# Patient Record
Sex: FEMALE | Race: WHITE | Employment: FULL TIME | URBAN - METROPOLITAN AREA
[De-identification: names, ages, dates, MRNs, and addresses within clinical notes are randomized per-mention and may not be internally consistent; named-entity substitution may affect disease eponyms.]

---

## 2017-02-15 ENCOUNTER — OFFICE VISIT (OUTPATIENT)
Dept: FAMILY MEDICINE | Facility: CLINIC | Age: 27
End: 2017-02-15
Payer: COMMERCIAL

## 2017-02-15 VITALS
HEART RATE: 62 BPM | WEIGHT: 162 LBS | DIASTOLIC BLOOD PRESSURE: 78 MMHG | TEMPERATURE: 96.3 F | SYSTOLIC BLOOD PRESSURE: 121 MMHG | HEIGHT: 68 IN | BODY MASS INDEX: 24.55 KG/M2

## 2017-02-15 DIAGNOSIS — Z00.00 ROUTINE GENERAL MEDICAL EXAMINATION AT A HEALTH CARE FACILITY: Primary | ICD-10-CM

## 2017-02-15 DIAGNOSIS — L60.3 BRITTLE NAILS: ICD-10-CM

## 2017-02-15 DIAGNOSIS — Z23 NEED FOR HEPATITIS A VACCINATION: ICD-10-CM

## 2017-02-15 LAB
CHOLEST SERPL-MCNC: 199 MG/DL
GLUCOSE SERPL-MCNC: 94 MG/DL (ref 70–99)
HDLC SERPL-MCNC: 50 MG/DL
LDLC SERPL CALC-MCNC: 128 MG/DL
NONHDLC SERPL-MCNC: 149 MG/DL
TRIGL SERPL-MCNC: 106 MG/DL
TSH SERPL DL<=0.005 MIU/L-ACNC: 0.98 MU/L (ref 0.4–4)

## 2017-02-15 PROCEDURE — 90632 HEPA VACCINE ADULT IM: CPT | Performed by: PHYSICIAN ASSISTANT

## 2017-02-15 PROCEDURE — 90471 IMMUNIZATION ADMIN: CPT | Performed by: PHYSICIAN ASSISTANT

## 2017-02-15 PROCEDURE — 80061 LIPID PANEL: CPT | Performed by: PHYSICIAN ASSISTANT

## 2017-02-15 PROCEDURE — 36415 COLL VENOUS BLD VENIPUNCTURE: CPT | Performed by: PHYSICIAN ASSISTANT

## 2017-02-15 PROCEDURE — 84443 ASSAY THYROID STIM HORMONE: CPT | Performed by: PHYSICIAN ASSISTANT

## 2017-02-15 PROCEDURE — 99395 PREV VISIT EST AGE 18-39: CPT | Mod: 25 | Performed by: PHYSICIAN ASSISTANT

## 2017-02-15 PROCEDURE — 82947 ASSAY GLUCOSE BLOOD QUANT: CPT | Performed by: PHYSICIAN ASSISTANT

## 2017-02-15 NOTE — MR AVS SNAPSHOT
After Visit Summary   2/15/2017    Kina Melendez    MRN: [id number]           Patient Information     Date Of Birth          1990        Visit Information        Provider Department      2/15/2017 8:20 AM Deonna Strong PA-C [facility]        Today's Diagnoses     Need for hepatitis A vaccination    -  1    Routine general medical examination at a health care facility        Brittle nails          Care Instructions      Preventive Health Recommendations  Female Ages 26 - 39  Yearly exam:   See your health care provider every year in order to    Review health changes.     Discuss preventive care.      Review your medicines if you your doctor has prescribed any.    Until age 30: Get a Pap test every three years (more often if you have had an abnormal result).    After age 30: Talk to your doctor about whether you should have a Pap test every 3 years or have a Pap test with HPV screening every 5 years.   You do not need a Pap test if your uterus was removed (hysterectomy) and you have not had cancer.  You should be tested each year for STDs (sexually transmitted diseases), if you're at risk.   Talk to your provider about how often to have your cholesterol checked.  If you are at risk for diabetes, you should have a diabetes test (fasting glucose).  Shots: Get a flu shot each year. Get a tetanus shot every 10 years.   Nutrition:     Eat at least 5 servings of fruits and vegetables each day.    Eat whole-grain bread, whole-wheat pasta and brown rice instead of white grains and rice.    Talk to your provider about Calcium and Vitamin D.     Lifestyle    Exercise at least 150 minutes a week (30 minutes a day, 5 days of the week). This will help you control your weight and prevent disease.    Limit alcohol to one drink per day.    No smoking.     Wear sunscreen to prevent skin cancer.    See your dentist every six months for an exam and cleaning.        Based on your medical  history and these are the current health maintenance or preventive care services that you are due for (some may have been done at this visit)  Health Maintenance Due   Topic Date Due     INFLUENZA VACCINE (SYSTEM ASSIGNED)  09/01/2016         At [facility], we strive to deliver an exceptional experience to you, every time we see you.    If you receive a survey in the mail, please send us back your thoughts. We really do value your feedback.    Your care team's suggested websites for health information:  Www.[employer].org : Up to date and easily searchable information on multiple topics.  Www.medlineplus.gov : medication info, interactive tutorials, watch real surgeries online  Www.familydoctor.org : good info from the Academy of Family Physicians  Www.cdc.gov : public health info, travel advisories, epidemics (H1N1)  Www.aap.org : children's health info, normal development, vaccinations  Www.health.[facility].[place].us : [place] dept of health, public health issues in [place], N1N1    How to contact your care team:   Team Deepthi/Vinny [phone]         Pharmacy [phone]    Dr. Bar, Raina Castro PA-C, Dr. Harris, Demetrice SENIOR CNP, Deonna Strong PA-C, Dr. Rosales, and PARRISH Soler CNP    Team RNs: Renetta & Katherine      Clinic hours  M-Th 7 am-7 pm   Fri 7 am-5 pm.   Urgent care M-F 11 am-9 pm,   Sat/Sun 9 am-5 pm.  Pharmacy M-Th 8 am-8 pm Fri 8 am-6 pm  Sat/Sun 9 am-5 pm.     All password changes, disabled accounts, or ID changes in [employer]/MyHealth will be done by our Access Services Department.    If you need help with your account or password, call: [phone]. Clinic staff no longer has the ability to change passwords.           Follow-ups after your visit        Who to contact     If you have questions or need follow up information about today's clinic visit or your schedule please contact [facility] directly at [phone].  Normal or non-critical  "lab and imaging results will be communicated to you by MyChart, letter or phone within 4 business days after the clinic has received the results. If you do not hear from us within 7 days, please contact the clinic through [employer] or phone. If you have a critical or abnormal lab result, we will notify you by phone as soon as possible.  Submit refill requests through [employer] or call your pharmacy and they will forward the refill request to us. Please allow 3 business days for your refill to be completed.          Additional Information About Your Visit        [employer] Information     [employer] gives you secure access to your electronic health record. If you see a primary care provider, you can also send messages to your care team and make appointments. If you have questions, please call your primary care clinic.  If you do not have a primary care provider, please call [phone] and they will assist you.        Care EveryWhere ID     This is your Care EveryWhere ID. This could be used by other organizations to access your [facility] medical records  [id number]        Your Vitals Were     Pulse Temperature Height Last Period Breastfeeding? BMI (Body Mass Index)    62 96.3  F (35.7  C) (Oral) 5' 8\" (1.727 m) 01/30/2017 No 24.63 kg/m2       Blood Pressure from Last 3 Encounters:   02/15/17 121/78   11/09/16 126/76   06/10/16 110/73    Weight from Last 3 Encounters:   02/15/17 162 lb (73.5 kg)   11/09/16 158 lb (71.7 kg)   06/10/16 154 lb (69.9 kg)              We Performed the Following     Glucose     HEPATITIS A VACCINE (ADULT)     Lipid panel reflex to direct LDL     TSH with free T4 reflex        Primary Care Provider Office Phone # Fax #    Deonna Strong PA-C [phone]       [facility] [address]  [facility] [address]        Thank you!     Thank you for choosing [facility]  for your care. Our goal is always to provide you with excellent care. Hearing back " from our patients is one way we can continue to improve our services. Please take a few minutes to complete the written survey that you may receive in the mail after your visit with us. Thank you!             Your Updated Medication List - Protect others around you: Learn how to safely use, store and throw away your medicines at www.disposemymeds.org.          This list is accurate as of: 2/15/17  9:16 AM.  Always use your most recent med list.                   Brand Name Dispense Instructions for use    triamcinolone 0.1 % cream    KENALOG    30 g    Apply sparingly to affected area two times daily for 10 days.

## 2017-02-15 NOTE — NURSING NOTE
Screening Questionnaire for Adult Immunization    Are you sick today?   No   Do you have allergies to medications, food, a vaccine component or latex?   No   Have you ever had a serious reaction after receiving a vaccination?   No   Do you have a long-term health problem with heart disease, lung disease, asthma, kidney disease, metabolic disease (e.g. diabetes), anemia, or other blood disorder?   No   Do you have cancer, leukemia, HIV/AIDS, or any other immune system problem?   No   In the past 3 months, have you taken medications that affect  your immune system, such as prednisone, other steroids, or anticancer drugs; drugs for the treatment of rheumatoid arthritis, Crohn s disease, or psoriasis; or have you had radiation treatments?   No   Have you had a seizure, or a brain or other nervous system problem?   No   During the past year, have you received a transfusion of blood or blood     products, or been given immune (gamma) globulin or antiviral drug?   No   For women: Are you pregnant or is there a chance you could become        pregnant during the next month?   No   Have you received any vaccinations in the past 4 weeks?   No     Immunization questionnaire answers were all negative.      MNVFC doesn't apply on this patient    Per orders of KIRAN Strong, injection of Hep A given by Lucy Buck. Patient instructed to remain in clinic for 20 minutes afterwards, and to report any adverse reaction to me immediately.       Screening performed by Lucy Buck on 2/15/2017 at 9:21 AM.

## 2017-02-15 NOTE — NURSING NOTE
"Chief Complaint   Patient presents with     Physical     Pt is fasting.       Initial /78 (BP Location: Right arm, Patient Position: Chair, Cuff Size: Adult Regular)  Pulse 62  Temp 96.3  F (35.7  C) (Oral)  Ht 5' 8\" (1.727 m)  Wt 162 lb (73.5 kg)  LMP 01/30/2017  Breastfeeding? No  BMI 24.63 kg/m2 Estimated body mass index is 24.63 kg/(m^2) as calculated from the following:    Height as of this encounter: 5' 8\" (1.727 m).    Weight as of this encounter: 162 lb (73.5 kg).  Medication Reconciliation: complete   An,CMA      "

## 2017-02-15 NOTE — PATIENT INSTRUCTIONS
Preventive Health Recommendations  Female Ages 26 - 39  Yearly exam:   See your health care provider every year in order to    Review health changes.     Discuss preventive care.      Review your medicines if you your doctor has prescribed any.    Until age 30: Get a Pap test every three years (more often if you have had an abnormal result).    After age 30: Talk to your doctor about whether you should have a Pap test every 3 years or have a Pap test with HPV screening every 5 years.   You do not need a Pap test if your uterus was removed (hysterectomy) and you have not had cancer.  You should be tested each year for STDs (sexually transmitted diseases), if you're at risk.   Talk to your provider about how often to have your cholesterol checked.  If you are at risk for diabetes, you should have a diabetes test (fasting glucose).  Shots: Get a flu shot each year. Get a tetanus shot every 10 years.   Nutrition:     Eat at least 5 servings of fruits and vegetables each day.    Eat whole-grain bread, whole-wheat pasta and brown rice instead of white grains and rice.    Talk to your provider about Calcium and Vitamin D.     Lifestyle    Exercise at least 150 minutes a week (30 minutes a day, 5 days of the week). This will help you control your weight and prevent disease.    Limit alcohol to one drink per day.    No smoking.     Wear sunscreen to prevent skin cancer.    See your dentist every six months for an exam and cleaning.        Based on your medical history and these are the current health maintenance or preventive care services that you are due for (some may have been done at this visit)  Health Maintenance Due   Topic Date Due     INFLUENZA VACCINE (SYSTEM ASSIGNED)  09/01/2016         At [facility], we strive to deliver an exceptional experience to you, every time we see you.    If you receive a survey in the mail, please send us back your thoughts. We really do value your  feedback.    Your care team's suggested websites for health information:  Www.[employer].org : Up to date and easily searchable information on multiple topics.  Www.medlineplus.gov : medication info, interactive tutorials, watch real surgeries online  Www.familydoctor.org : good info from the Academy of Family Physicians  Www.cdc.gov : public health info, travel advisories, epidemics (H1N1)  Www.aap.org : children's health info, normal development, vaccinations  Www.health.[facility].[place].us : [place] dept of health, public health issues in [place], N1N1    How to contact your care team:   Team Deepthi/Spirit [phone]         Pharmacy [phone]    Dr. Bar, Raina Castro PA-C, Dr. Harris, Demetrice Barnes APRN CNP, Deonna Strong PA-C, Dr. Rosales, and PARRISH Soler CNP    Team RNs: Reentta & Katherine      Clinic hours  M-Th 7 am-7 pm   Fri 7 am-5 pm.   Urgent care M-F 11 am-9 pm,   Sat/Sun 9 am-5 pm.  Pharmacy M-Th 8 am-8 pm Fri 8 am-6 pm  Sat/Sun 9 am-5 pm.     All password changes, disabled accounts, or ID changes in [employer]/MyHealth will be done by our Access Services Department.    If you need help with your account or password, call: [phone]. Clinic staff no longer has the ability to change passwords.

## 2017-02-15 NOTE — PROGRESS NOTES
SUBJECTIVE:     CC: Kina Melendez is an 26 year old woman who presents for preventive health visit.     Healthy Habits:  Answers for HPI/ROS submitted by the patient on 2/14/2017   Annual Exam:  Getting at least 3 servings of Calcium per day:: Yes  Bi-annual eye exam:: Yes  Dental care twice a year:: NO  Sleep apnea or symptoms of sleep apnea:: None  Diet:: Regular (no restrictions)  Frequency of exercise:: 2-3 days/week  Taking medications regularly:: Not Applicable  Medication side effects:: Not applicable  Additional concerns today:: No  PHQ-2 Depressed: Not at all, Not at all  PHQ-2 Score: 0  Duration of exercise:: 15-30 minutes    Today's PHQ-2 Score: 0  PHQ-2 ( 1999 Pf[employer]) 2/15/2017 2/14/2017   Q1: Little interest or pleasure in doing things 0 -   Q2: Feeling down, depressed or hopeless 0 -   PHQ-2 Score 0 -   Little interest or pleasure in doing things - Not at all   Feeling down, depressed or hopeless - Not at all   PHQ-2 Score - 0       Abuse: Current or Past(Physical, Sexual or Emotional)- No  Do you feel safe in your environment - Yes    Periods are regular.  She does c/o dry brittle nails and some increased irritability (has 2 jobs and in school so stress levels are high)    Social History   Substance Use Topics     Smoking status: Never Smoker     Smokeless tobacco: Never Used     Alcohol use Yes      Comment: occasional, maybe 1 beer/glass of wine a week     The patient does not drink >3 drinks per day nor >7 drinks per week.    Recent Labs   Lab Test  12/07/15   1147  05/24/13   0747   CHOL  176  192   HDL  47*  37*   LDL  111*  124   TRIG  88  155*   CHOLHDLRATIO   --   5.2*   NHDL  129   --        Reviewed orders with patient.  Reviewed health maintenance and updated orders accordingly - Yes    Mammo Decision Support:  Mammogram not appropriate for this patient based on age.    Pertinent mammograms are reviewed under the imaging tab.  History of abnormal Pap smear: NO - age 21-29 PAP every 3  "years recommended  All Histories reviewed and updated in Epic.      ROS:  C: NEGATIVE for fever, chills, change in weight  I: NEGATIVE for worrisome rashes, moles or lesions  E: NEGATIVE for vision changes or irritation  ENT: NEGATIVE for ear, mouth and throat problems  R: NEGATIVE for significant cough or SOB  B: NEGATIVE for masses, tenderness or discharge  CV: NEGATIVE for chest pain, palpitations or peripheral edema  GI: NEGATIVE for nausea, abdominal pain, heartburn, or change in bowel habits  : NEGATIVE for unusual urinary or vaginal symptoms. Periods are regular.  M: NEGATIVE for significant arthralgias or myalgia  N: NEGATIVE for weakness, dizziness or paresthesias  P: NEGATIVE for changes in mood or affect    Problem list, Medication list, Allergies, and Medical/Social/Surgical histories reviewed in [facility] and updated as appropriate.  Labs reviewed in EPIC  BP Readings from Last 3 Encounters:   02/15/17 121/78   11/09/16 126/76   06/10/16 110/73    Wt Readings from Last 3 Encounters:   02/15/17 162 lb (73.5 kg)   11/09/16 158 lb (71.7 kg)   06/10/16 154 lb (69.9 kg)                  OBJECTIVE:     /78 (BP Location: Right arm, Patient Position: Chair, Cuff Size: Adult Regular)  Pulse 62  Temp 96.3  F (35.7  C) (Oral)  Ht 5' 8\" (1.727 m)  Wt 162 lb (73.5 kg)  LMP 01/30/2017  Breastfeeding? No  BMI 24.63 kg/m2  EXAM:  GENERAL: healthy, alert and no distress  EYES: Eyes grossly normal to inspection, PERRL and conjunctivae and sclerae normal  HENT: ear canals and TM's normal, nose and mouth without ulcers or lesions  NECK: no adenopathy, no asymmetry, masses, or scars and thyroid normal to palpation  RESP: lungs clear to auscultation - no rales, rhonchi or wheezes  BREAST: normal without masses, tenderness or nipple discharge and no palpable axillary masses or adenopathy  CV: regular rate and rhythm, normal S1 S2, no S3 or S4, no murmur, click or rub, no peripheral edema and peripheral pulses " "strong  ABDOMEN: soft, nontender, no hepatosplenomegaly, no masses and bowel sounds normal   (female): normal female external genitalia, normal urethral meatus, vaginal mucosa pink, moist, well rugated, and normal cervix/adnexa/uterus without masses or discharge  MS: no gross musculoskeletal defects noted, no edema  SKIN: no suspicious lesions or rashes  NEURO: Normal strength and tone, mentation intact and speech normal  PSYCH: mentation appears normal, affect normal/bright    ASSESSMENT/PLAN:     1. Routine general medical examination at a health care facility       HEALTH CARE MAINTENANCE              Reviewed USPTF recommendations and anticipatory guidance.              See orders.     Pap next year.      - Lipid panel reflex to direct LDL  - Glucose    2. 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 Brittle nails  Will check TSH  - TSH with free T4 reflex    COUNSELING:   Reviewed preventive health counseling, as reflected in patient instructions       Regular exercise       Healthy diet/nutrition       Safe sex practices/STD prevention         reports that she has never smoked. She has never used smokeless tobacco.    Estimated body mass index is 24.63 kg/(m^2) as calculated from the following:    Height as of this encounter: 5' 8\" (1.727 m).    Weight as of this encounter: 162 lb (73.5 kg).       Counseling Resources:  ATP IV Guidelines  Pooled Cohorts Equation Calculator  Breast Cancer Risk Calculator  FRAX Risk Assessment  ICSI Preventive Guidelines  Dietary Guidelines for Americans, 2010  USDA's MyPlate  ASA Prophylaxis  Lung CA Screening    Deonna Strong PA-C  [facility]    "

## 2017-03-16 ENCOUNTER — OFFICE VISIT (OUTPATIENT)
Dept: FAMILY MEDICINE | Facility: CLINIC | Age: 27
End: 2017-03-16
Payer: COMMERCIAL

## 2017-03-16 VITALS
SYSTOLIC BLOOD PRESSURE: 120 MMHG | OXYGEN SATURATION: 100 % | BODY MASS INDEX: 24.55 KG/M2 | HEIGHT: 68 IN | WEIGHT: 162 LBS | TEMPERATURE: 97 F | HEART RATE: 75 BPM | DIASTOLIC BLOOD PRESSURE: 78 MMHG

## 2017-03-16 DIAGNOSIS — M25.649 MORNING JOINT STIFFNESS OF HAND, UNSPECIFIED LATERALITY: ICD-10-CM

## 2017-03-16 DIAGNOSIS — R20.2 PARESTHESIA: Primary | ICD-10-CM

## 2017-03-16 DIAGNOSIS — E55.9 VITAMIN D DEFICIENCY: ICD-10-CM

## 2017-03-16 LAB
CRP SERPL-MCNC: 41.8 MG/L (ref 0–8)
ERYTHROCYTE [SEDIMENTATION RATE] IN BLOOD BY WESTERGREN METHOD: 7 MM/H (ref 0–20)
VIT B12 SERPL-MCNC: 446 PG/ML (ref 193–986)

## 2017-03-16 PROCEDURE — 99000 SPECIMEN HANDLING OFFICE-LAB: CPT | Performed by: PREVENTIVE MEDICINE

## 2017-03-16 PROCEDURE — 86140 C-REACTIVE PROTEIN: CPT | Performed by: PREVENTIVE MEDICINE

## 2017-03-16 PROCEDURE — 82607 VITAMIN B-12: CPT | Performed by: PREVENTIVE MEDICINE

## 2017-03-16 PROCEDURE — 86039 ANTINUCLEAR ANTIBODIES (ANA): CPT | Mod: 90 | Performed by: PREVENTIVE MEDICINE

## 2017-03-16 PROCEDURE — 36415 COLL VENOUS BLD VENIPUNCTURE: CPT | Performed by: PREVENTIVE MEDICINE

## 2017-03-16 PROCEDURE — 99213 OFFICE O/P EST LOW 20 MIN: CPT | Performed by: PREVENTIVE MEDICINE

## 2017-03-16 PROCEDURE — 86431 RHEUMATOID FACTOR QUANT: CPT | Performed by: PREVENTIVE MEDICINE

## 2017-03-16 PROCEDURE — 85652 RBC SED RATE AUTOMATED: CPT | Performed by: PREVENTIVE MEDICINE

## 2017-03-16 PROCEDURE — 82306 VITAMIN D 25 HYDROXY: CPT | Performed by: PREVENTIVE MEDICINE

## 2017-03-16 ASSESSMENT — PAIN SCALES - GENERAL: PAINLEVEL: NO PAIN (0)

## 2017-03-16 NOTE — PATIENT INSTRUCTIONS
At [facility], we strive to deliver an exceptional experience to you, every time we see you.    If you receive a survey in the mail, please send us back your thoughts. We really do value your feedback.    Thank you for visiting [facility]    Normal or non-critical lab and imaging results will be communicated to you by MyChart, letter or phone within 7 days.  If you do not hear from us within 10 days, please call the clinic. If you have a critical or abnormal lab result, we will notify you by phone as soon as possible.     If you have any questions regarding your visit please contact:     Team Deepthi/Spirit  Clinic Hours Telephone Number   Dr. Dipika Barnes   7am-7pm  Monday through Thursday  7am-5pm Friday [phone]  Renetta PRESCOTT RN   Pharmacy 8:30am-9pm Monday-Friday    9am-5pm Saturday-Sunday [phone]   Urgent Care 11am-9pm Monday-Friday        9am-5pm Saturday-Sunday [phone]     After hours, weekend or if you need to make an appointment with your primary provider please call [phone].   After Hours nurse advise: call [facility]: [phone]    Use [employer] (secure email communication and access to your chart) to send your primary care provider a message or make an appointment. Ask someone on your Team how to sign up for [employer]. To log on to [employer] or for more information in [employer] please visit the website at [email or web address].   As of October 8, 2013, all password changes, disabled accounts, or ID changes in [employer]/MyHealth will be done by our Access Services Department.   If you need help with your account or password, call: [phone]. Clinic staff no longer has the ability to change passwords.

## 2017-03-16 NOTE — PROGRESS NOTES
"  SUBJECTIVE:                                                    Kina Melendez is a 26 year old female who presents to clinic today for the following health issues:      Musculoskeletal problem/pain      Duration: off and on x 3 years    Description  Location: both arms and hands    Intensity:  moderate    Accompanying signs and symptoms: numbness, tingling and weakness of hands    History  Previous similar problem: no   Previous evaluation:  none    Precipitating or alleviating factors:  Trauma or overuse: no   Aggravating factors include: overuse    Therapies tried and outcome: nothing       {additional problems for provider to add:323249}    Problem list and histories reviewed & adjusted, as indicated.  Additional history: {NONE - AS DOCUMENTED:750683::\"as documented\"}    {HIST REVIEW/ LINKS 2:441679}    Reviewed and updated as needed this visit by clinical staff  Tobacco  Allergies  Meds       Reviewed and updated as needed this visit by Provider         {PROVIDER CHARTING PREFERENCE:043687}    "

## 2017-03-16 NOTE — NURSING NOTE
"Chief Complaint   Patient presents with     Numbness     Tingling       Initial /78  Pulse 75  Temp 97  F (36.1  C) (Oral)  Ht 5' 8\" (1.727 m)  Wt 162 lb (73.5 kg)  LMP 01/30/2017  SpO2 100%  Breastfeeding? No  BMI 24.63 kg/m2 Estimated body mass index is 24.63 kg/(m^2) as calculated from the following:    Height as of this encounter: 5' 8\" (1.727 m).    Weight as of this encounter: 162 lb (73.5 kg).  Medication Reconciliation: complete   Aby MURRELL        "

## 2017-03-16 NOTE — MR AVS SNAPSHOT
After Visit Summary   3/16/2017    Kina Melendez    MRN: [id number]           Patient Information     Date Of Birth          1990        Visit Information        Provider Department      3/16/2017 2:40 PM Leigh Harris MD [facility]        Today's Diagnoses     Paresthesia    -  1    Morning joint stiffness of hand, unspecified laterality        Vitamin D deficiency          Care Instructions    At [facility], we strive to deliver an exceptional experience to you, every time we see you.    If you receive a survey in the mail, please send us back your thoughts. We really do value your feedback.    Thank you for visiting [facility]    Normal or non-critical lab and imaging results will be communicated to you by MyChart, letter or phone within 7 days.  If you do not hear from us within 10 days, please call the clinic. If you have a critical or abnormal lab result, we will notify you by phone as soon as possible.     If you have any questions regarding your visit please contact:     Team [employer]  Clinic Hours Telephone Number   Dr. Dipika Barnes   7am-7pm  Monday through Thursday  7am-5pm Friday [phone]  Renetta PRESCOTT RN   Pharmacy 8:30am-9pm Monday-Friday    9am-5pm Saturday-Sunday [phone]   Urgent Care 11am-9pm Monday-Friday        9am-5pm Saturday-Sunday [phone]     After hours, weekend or if you need to make an appointment with your primary provider please call [phone].   After Hours nurse advise: call [place] [facility]: [phone]    Use [employer] (secure email communication and access to your chart) to send your primary care provider a message or make an appointment. Ask someone on your Team how to sign up for [employer]. To log on to [employer] or for more information in [employer] please visit the  "website at [email or web address].   As of October 8, 2013, all password changes, disabled accounts, or ID changes in [employer]/MyHealth will be done by our Access Services Department.   If you need help with your account or password, call: [phone]. Clinic staff no longer has the ability to change passwords.           Follow-ups after your visit        Follow-up notes from your care team     Return if symptoms worsen or fail to improve.      Who to contact     If you have questions or need follow up information about today's clinic visit or your schedule please contact [facility] directly at [phone].  Normal or non-critical lab and imaging results will be communicated to you by [employer], letter or phone within 4 business days after the clinic has received the results. If you do not hear from us within 7 days, please contact the clinic through [employer] or phone. If you have a critical or abnormal lab result, we will notify you by phone as soon as possible.  Submit refill requests through [employer] or call your pharmacy and they will forward the refill request to us. Please allow 3 business days for your refill to be completed.          Additional Information About Your Visit        [employer] Information     [employer] gives you secure access to your electronic health record. If you see a primary care provider, you can also send messages to your care team and make appointments. If you have questions, please call your primary care clinic.  If you do not have a primary care provider, please call [phone] and they will assist you.        Care EveryWhere ID     This is your Care EveryWhere ID. This could be used by other organizations to access your [place] medical records  [id number]        Your Vitals Were     Pulse Temperature Height Last Period Pulse Oximetry Breastfeeding?    75 97  F (36.1  C) (Oral) 5' 8\" (1.727 m) 03/02/2017 (Exact Date) 100% No    BMI (Body Mass Index)                "    24.63 kg/m2            Blood Pressure from Last 3 Encounters:   03/16/17 120/78   02/15/17 121/78   11/09/16 126/76    Weight from Last 3 Encounters:   03/16/17 162 lb (73.5 kg)   02/15/17 162 lb (73.5 kg)   11/09/16 158 lb (71.7 kg)              We Performed the Following     CRP, inflammation     ESR: Erythrocyte sedimentation rate     Nuclear Antibody EBENEZER by IFA IgG     Rheumatoid factor     Vitamin B12     Vitamin D Deficiency          Today's Medication Changes          These changes are accurate as of: 3/16/17 11:59 PM.  If you have any questions, ask your nurse or doctor.               Start taking these medicines.        Dose/Directions    cholecalciferol 58148 UNITS capsule   Commonly known as:  VITAMIN D3   Used for:  Vitamin D deficiency   Started by:  Leigh Harris MD        Dose:  1 capsule   Take 1 capsule (50,000 Units) by mouth once a week   Quantity:  8 capsule   Refills:  0            Where to get your medicines      These medications were sent to [id number] - [place], [place] - [id number] [facility] [address]     Phone:  [phone]     cholecalciferol 82355 UNITS capsule                Primary Care Provider Office Phone # Fax #    Deonna Strong PA-C [phone]       [facility] [address]  [facility] [address]        Thank you!     Thank you for choosing [facility]  for your care. Our goal is always to provide you with excellent care. Hearing back from our patients is one way we can continue to improve our services. Please take a few minutes to complete the written survey that you may receive in the mail after your visit with us. Thank you!             Your Updated Medication List - Protect others around you: Learn how to safely use, store and throw away your medicines at www.disposemymeds.org.          This list is accurate as of: 3/16/17 11:59 PM.  Always use your most recent med list.                    Brand Name Dispense Instructions for use    cholecalciferol 03099 UNITS capsule    VITAMIN D3    8 capsule    Take 1 capsule (50,000 Units) by mouth once a week

## 2017-03-17 PROBLEM — E55.9 VITAMIN D DEFICIENCY: Status: ACTIVE | Noted: 2017-03-17

## 2017-03-17 LAB
DEPRECATED CALCIDIOL+CALCIFEROL SERPL-MC: 8 UG/L (ref 20–75)
RHEUMATOID FACT SER NEPH-ACNC: <20 IU/ML (ref 0–20)

## 2017-03-18 NOTE — PROGRESS NOTES
SUBJECTIVE:                                                    Kina Melendez is a 26 year old female who presents to clinic today for the following health issues:    I have reviewed and agree with the documentation by the MA. I updated the history as indicated.  Leigh Harris MD MPH    Musculoskeletal problem/pain      Duration: off and on x 3 years    Description  Location: both arms and hands    Intensity:  moderate    Accompanying signs and symptoms: numbness, tingling and weakness of hands    History  Previous similar problem: no   Previous evaluation:  none    Precipitating or alleviating factors:  Trauma or overuse: no   Aggravating factors include: overuse    Therapies tried and outcome: nothing     Symptoms are more in the morning, has problems holding her hairbrush and gripping steering wheel, progressively improves later in the day. 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 Is right hand dominant. Numbness below both elbows, no lower extremity numbness. No rash, no cyanosis, no aggravating or relieving factors. No new joint pains.     Problem list and histories reviewed & adjusted, as indicated.  Additional history: as documented    Patient Active Problem List   Diagnosis     CARDIOVASCULAR SCREENING; LDL GOAL LESS THAN 160     INTERNAL DERANGEMENT OF KNEE - right     Synovitis of knee     Family history of lung cancer     Left-sided low back pain without sciatica     Past Surgical History   Procedure Laterality Date     Left knee lrr meniscectomy  9/7/2005     DML     Appendectomy         Social History   Substance Use Topics     Smoking status: Never Smoker     Smokeless tobacco: Never Used     Alcohol use Yes      Comment: occasional, maybe 1 beer/glass of wine a week     Family History   Problem Relation Age of Onset     Other Cancer Father      Lung Cancer     DIABETES Maternal Grandmother      Other Cancer Maternal Grandmother      Kidney Cancer with mets to liver and  "brain     DIABETES Paternal Grandmother      CEREBROVASCULAR DISEASE Paternal Grandmother      Breast Cancer Other      Paternal Aunt/Paternal Aunt     Other Cancer Other      Paternal Uncle-Pancreatic Cancer/Paternal Uncle-Pancreatic Cancer     Depression Brother      Breast Cancer Other      Paternal Aunt     Other Cancer Other      Paternal Uncle-Pancreatic Cancer     Asthma No family hx of      C.A.D. No family hx of      Hypertension No family hx of          No current outpatient prescriptions on file.     Allergies   Allergen Reactions     No Known Drug Allergies      BP Readings from Last 3 Encounters:   03/16/17 120/78   02/15/17 121/78   11/09/16 126/76    Wt Readings from Last 3 Encounters:   03/16/17 162 lb (73.5 kg)   02/15/17 162 lb (73.5 kg)   11/09/16 158 lb (71.7 kg)                    Reviewed and updated as needed this visit by clinical staff  Tobacco  Allergies  Meds       Reviewed and updated as needed this visit by Provider         ROS:  Constitutional, HEENT, cardiovascular, pulmonary, gi and gu systems are negative, except as otherwise noted.    OBJECTIVE:                                                    /78  Pulse 75  Temp 97  F (36.1  C) (Oral)  Ht 5' 8\" (1.727 m)  Wt 162 lb (73.5 kg)  LMP 03/02/2017 (Exact Date)  SpO2 100%  Breastfeeding? No  BMI 24.63 kg/m2  Body mass index is 24.63 kg/(m^2).  GENERAL APPEARANCE: healthy, alert and no distress  EYES: Eyes grossly normal to inspection and conjunctivae and sclerae normal  NECK: no asymmetry, masses, or scars  RESP: lungs clear to auscultation - no rales, rhonchi or wheezes  CV: regular rates and rhythm, normal S1 S2, no S3 or S4 and no murmur, click or rub  ABDOMEN: soft, non-tender  MS: extremities normal- no gross deformities noted and peripheral pulses normal  ORTHO: Elbow Exam: Inspection: no swelling, no ecchymosis, no olecranon bursa swelling  Non-tender: lateral epicondyle and medial epicondyle  Range of Motion: all " normal  Strength: elbow strength full    Wrist Exam: WRIST:  Inspection: no swelling  no effusion  Palpation: Tender: NON TENDER:   Range of Motion: normal  Strength: no deficits    Hand/Finger Exam: Inspection:All Normal  Tender: All Normal  Non-tender: All Normal  Range of Motion All Normal  Strength: full strength    SKIN: no suspicious lesions or rashes  NEURO: Normal strength and tone, mentation intact and speech normal  PSYCH: mentation appears normal    Diagnostic test results:  Diagnostic Test Results:  Results for orders placed or performed in visit on 03/16/17   Vitamin B12   Result Value Ref Range    Vitamin B12 446 193 - 986 pg/mL   Vitamin D Deficiency   Result Value Ref Range    Vitamin D Deficiency screening 8 (L) 20 - 75 ug/L   ESR: Erythrocyte sedimentation rate   Result Value Ref Range    Sed Rate 7 0 - 20 mm/h   CRP, inflammation   Result Value Ref Range    CRP Inflammation 41.8 (H) 0.0 - 8.0 mg/L   Rheumatoid factor   Result Value Ref Range    Rheumatoid Factor <20 <20 IU/mL        ASSESSMENT/PLAN:                                                      (R20.2) Paresthesia  (primary encounter diagnosis)  Comment: For several years  Plan: Vitamin B12, Vitamin D Deficiency, ESR:         Erythrocyte sedimentation rate, CRP,         inflammation, Rheumatoid factor, Nuclear         Antibody EBENEZER by IFA IgG        CRP is elevated     (M25.649) Morning joint stiffness of hand, unspecified laterality  Comment: Bilateral hands  Plan: Vitamin B12, Vitamin D Deficiency, ESR:         Erythrocyte sedimentation rate, CRP,         inflammation, Rheumatoid factor, Nuclear         Antibody EBENEZER by IFA IgG        Await labs  TSH and glucose normal in the last month     (E55.9) Vitamin D deficiency  Comment: Levels at 8  Plan: cholecalciferol (VITAMIN D3) 68389 UNITS         capsule        Your vitamin D level was low. Maintaining adequate levels is very important for good health. Low levels can cause fatigue and joint  pains. I recommend starting high dose vitamin D.  I have sent a prescription for vitamin D 50,000IU to your pharmacy for you to . You should take this once weekly for 8 weeks, then take over the counter Vitamin D3 at a dose of 2000 units daily. We will recheck labs in 3 months.     Follow up with Provider - If symptoms are not improving with Vitamin D supplementation then Neurology referral would be indicated.      Leigh Harris MD MPH    [facility]

## 2017-03-19 NOTE — PROGRESS NOTES
Kina,     Your vitamin D level was low at 8, should be over 30. Maintaining adequate levels is very important for good health. Low levels can cause fatigue and joint pains. I recommend starting high dose vitamin D.  I have sent a prescription for vitamin D 50,000IU to your pharmacy for you to . You should take this once weekly for 8 weeks, then take over the counter Vitamin D3 at a dose of 2000 units daily.     Rheumatoid factor and ESR (inflammation marker)  were negative. Vitamin B 12 levels were normal.  One marker for inflammation CRP was elevated    We will recheck labs in 3 months.     Please do not hesitate to call us at [phone] if you have any questions or concerns.    Thank you,    Leigh Harris MD MPH

## 2017-03-20 LAB — NUCLEAR IGG TITR SER IF: NORMAL {TITER}

## 2017-03-21 NOTE — PROGRESS NOTES
EBENEZER [place] blood test (for auto immune diseases) was negative.     Please do not hesitate to call us at [phone] if you have any questions or concerns.    Thank you,    Leigh Harris MD MPH

## 2017-06-20 DIAGNOSIS — E55.9 VITAMIN D DEFICIENCY: ICD-10-CM

## 2017-06-20 DIAGNOSIS — M25.60 JOINT STIFFNESS: ICD-10-CM

## 2017-06-20 LAB — CRP SERPL-MCNC: <2.9 MG/L (ref 0–8)

## 2017-06-20 PROCEDURE — 86140 C-REACTIVE PROTEIN: CPT | Performed by: PREVENTIVE MEDICINE

## 2017-06-20 PROCEDURE — 82306 VITAMIN D 25 HYDROXY: CPT | Performed by: PREVENTIVE MEDICINE

## 2017-06-20 PROCEDURE — 36415 COLL VENOUS BLD VENIPUNCTURE: CPT | Performed by: PREVENTIVE MEDICINE

## 2017-06-21 LAB — DEPRECATED CALCIDIOL+CALCIFEROL SERPL-MC: 40 UG/L (ref 20–75)

## 2017-06-21 NOTE — PROGRESS NOTES
Kina, your test results were within normal limits. Vitamin D levels have improved from last check and are now in the normal range.     Please do not hesitate to call us at [phone] if you have any questions or concerns.    Thank you,    Leigh Harris MD MPH

## 2017-06-21 NOTE — PROGRESS NOTES
Kina,     CRP (marker of inflammation) is now normal compared to 3 months ago. Vitamin D levels are pending.     Please do not hesitate to call us at [phone] if you have any questions or concerns.    Thank you,    Leigh Harris MD MPH

## 2017-07-25 ENCOUNTER — MYC MEDICAL ADVICE (OUTPATIENT)
Dept: FAMILY MEDICINE | Facility: CLINIC | Age: 27
End: 2017-07-25

## 2019-01-06 ENCOUNTER — OFFICE VISIT (OUTPATIENT)
Dept: URGENT CARE | Facility: URGENT CARE | Age: 29
End: 2019-01-06
Payer: COMMERCIAL

## 2019-01-06 VITALS
RESPIRATION RATE: 16 BRPM | TEMPERATURE: 98 F | HEART RATE: 92 BPM | BODY MASS INDEX: 25.24 KG/M2 | SYSTOLIC BLOOD PRESSURE: 136 MMHG | WEIGHT: 166 LBS | DIASTOLIC BLOOD PRESSURE: 84 MMHG

## 2019-01-06 DIAGNOSIS — R10.9 ACUTE RIGHT FLANK PAIN: Primary | ICD-10-CM

## 2019-01-06 LAB
ALBUMIN UR-MCNC: ABNORMAL MG/DL
APPEARANCE UR: CLEAR
BACTERIA #/AREA URNS HPF: ABNORMAL /HPF
BILIRUB UR QL STRIP: NEGATIVE
COLOR UR AUTO: YELLOW
GLUCOSE UR STRIP-MCNC: NEGATIVE MG/DL
HGB UR QL STRIP: ABNORMAL
KETONES UR STRIP-MCNC: 15 MG/DL
LEUKOCYTE ESTERASE UR QL STRIP: NEGATIVE
MUCOUS THREADS #/AREA URNS LPF: PRESENT /LPF
NITRATE UR QL: NEGATIVE
NON-SQ EPI CELLS #/AREA URNS LPF: ABNORMAL /LPF
PH UR STRIP: 6 PH (ref 5–7)
RBC #/AREA URNS AUTO: ABNORMAL /HPF
SOURCE: ABNORMAL
SP GR UR STRIP: >1.03 (ref 1–1.03)
UROBILINOGEN UR STRIP-ACNC: 0.2 EU/DL (ref 0.2–1)
WBC #/AREA URNS AUTO: ABNORMAL /HPF

## 2019-01-06 PROCEDURE — 99214 OFFICE O/P EST MOD 30 MIN: CPT | Performed by: INTERNAL MEDICINE

## 2019-01-06 PROCEDURE — 81001 URINALYSIS AUTO W/SCOPE: CPT | Performed by: INTERNAL MEDICINE

## 2019-01-06 PROCEDURE — 99207 ZZC FOR CODING REVIEW: CPT | Performed by: INTERNAL MEDICINE

## 2019-01-06 ASSESSMENT — ENCOUNTER SYMPTOMS
DYSURIA: 0
SORE THROAT: 0
NAUSEA: 1
FEVER: 0
HEMATURIA: 0
ABDOMINAL PAIN: 1
VOMITING: 0

## 2019-01-06 NOTE — PATIENT INSTRUCTIONS
Due to amount of pain & discomfort, father to drive Kina to ER for evaluation of right back/flank pain    Potentially could kidney stone   May need imaging.      Component      Latest Ref Rng & Units 1/6/2019   Color Urine       Yellow   Appearance Urine       Clear   Glucose Urine      NEG:Negative mg/dL Negative   Bilirubin Urine      NEG:Negative Negative   Ketones Urine      NEG:Negative mg/dL 15 (A)   Specific Gravity Urine      1.003 - 1.035 >1.030   Blood Urine      NEG:Negative Trace (A)   pH Urine      5.0 - 7.0 pH 6.0   Protein Albumin Urine      NEG:Negative mg/dL Trace (A)   Urobilinogen Urine      0.2 - 1.0 EU/dL 0.2   Nitrite Urine      NEG:Negative Negative   Leukocyte Esterase Urine      NEG:Negative Negative   Source       Midstream Urine   WBC Urine      OTO5:0 - 5 /HPF 0 - 5   RBC Urine      OTO2:O - 2 /HPF 2-5 (A)   Squamous Epithelial /LPF Urine      FEW:Few /LPF Few   Bacteria Urine      NEG:Negative /HPF Moderate (A)   Mucous Urine      NEG:Negative /LPF Present (A)

## 2019-01-06 NOTE — PROGRESS NOTES
SUBJECTIVE:   Kina Melendez is a 28 year old female presenting with a chief complaint of   Chief Complaint   Patient presents with     Back Pain       She is an established patient of [facility].    Back Pain  Thought had UTI with urine frequency for few days  Onset of symptoms was 1 hour(s) ago.  Location: right low back  treatment tylenol  Had bowel movement without improvement or chagne      Radiation: right flank  Context:   no injury recalled  Course of symptoms is worsening.    Severity severe  Current and Associated symptoms: pain  Denies: fecal incontinence, urinary incontinence, lower extremity numbness, lower extremity weakness and paresthesia    Aggravating Factors: movement  Therapies to improve symptoms include: Tylenol  Past history: no prior back problems    S/p appy    fam hx kidney stones      Review of Systems   Constitutional: Negative for fever.   HENT: Negative for sore throat.    Gastrointestinal: Positive for abdominal pain and nausea. Negative for vomiting.   Genitourinary: Negative for dysuria, hematuria and vaginal discharge.       Past Medical History:   Diagnosis Date     Synovitis of knee 3/29/2012     Family History   Problem Relation Age of Onset     Other Cancer Father         Lung Cancer     Nephrolithiasis Father      Diabetes Maternal Grandmother      Other Cancer Maternal Grandmother         Kidney Cancer with mets to liver and brain     Diabetes Paternal Grandmother      Cerebrovascular Disease Paternal Grandmother      Breast Cancer Other         Paternal Aunt/Paternal Aunt     Other Cancer Other         Paternal Uncle-Pancreatic Cancer/Paternal Uncle-Pancreatic Cancer     Depression Brother      Breast Cancer Other         Paternal Aunt     Other Cancer Other         Paternal Uncle-Pancreatic Cancer     Asthma No family hx of      C.A.D. No family hx of      Hypertension No family hx of      Current Outpatient Medications   Medication Sig Dispense Refill     cholecalciferol  (VITAMIN D3) 93905 UNITS capsule Take 1 capsule (50,000 Units) by mouth once a week 8 capsule 0     Social History     Tobacco Use     Smoking status: Never Smoker     Smokeless tobacco: Never Used   Substance Use Topics     Alcohol use: Yes     Comment: occasional, maybe 1 beer/glass of wine a week       OBJECTIVE  /84   Pulse 92   Temp 98  F (36.7  C)   Resp 16   Wt 75.3 kg (166 lb)   BMI 25.24 kg/m      Physical Exam   Constitutional: She appears distressed.   Patient appears very uncomfortable.  She cannot find a comfortable position.  When I initially walked in the room she was standing bending over the sink as she felt nauseated.  She is unable to find a comfortable position sitting and Moving her right leg   Constantly   Abdominal: Soft. There is no rebound and no guarding.   Right flank discomfort   Musculoskeletal:   Right CVA tenderness/right flank tenderness  She is in extreme discomfort    No spinal tenderness noted  No left CVA discomfort   Neurological:   Normal strength and sensation bilateral lower extremities   Vitals reviewed.      Labs:  Results for orders placed or performed in visit on 01/06/19 (from the past 24 hour(s))   *UA reflex to Microscopic and Culture ([facility] (except [place] and [place])   Result Value Ref Range    Color Urine Yellow     Appearance Urine Clear     Glucose Urine Negative NEG^Negative mg/dL    Bilirubin Urine Negative NEG^Negative    Ketones Urine 15 (A) NEG^Negative mg/dL    Specific Gravity Urine >1.030 1.003 - 1.035    Blood Urine Trace (A) NEG^Negative    pH Urine 6.0 5.0 - 7.0 pH    Protein Albumin Urine Trace (A) NEG^Negative mg/dL    Urobilinogen Urine 0.2 0.2 - 1.0 EU/dL    Nitrite Urine Negative NEG^Negative    Leukocyte Esterase Urine Negative NEG^Negative    Source Midstream Urine    Urine Microscopic   Result Value Ref Range    WBC Urine 0 - 5 OTO5^0 - 5 /HPF    RBC Urine 2-5 (A) OTO2^O - 2 /HPF    Squamous Epithelial /LPF  Urine Few FEW^Few /LPF    Bacteria Urine Moderate (A) NEG^Negative /HPF    Mucous Urine Present (A) NEG^Negative /LPF           ASSESSMENT:      ICD-10-CM    1. Acute right flank pain R10.9 *UA reflex to Microscopic and Culture ([place] [facility] (except [place] and [place])     Urine Microscopic        Medical Decision Making:    Differential Diagnosis:  Pyelonephritis -urinalysis does not suggest urine infection  Kidney Stone - very few red cells noted in urine   Adhesions -she has had appendix and appendectomy in the past  LBP with sciatica - Neuro exam normal       Family history of kidney stones  PLAN:  Due to her extreme discomfort and high pain level, I felt is more appropriate for her to be further evaluated in the emergency room.  Urinalysis does show small amount of red blood cells which potentially could be related to a kidney stone.        Patient Instructions     Due to amount of pain & discomfort, father to drive Kina to ER for evaluation of right back/flank pain    Potentially could kidney stone   May need imaging.      Component      Latest Ref Rng & Units 1/6/2019   Color Urine       Yellow   Appearance Urine       Clear   Glucose Urine      NEG:Negative mg/dL Negative   Bilirubin Urine      NEG:Negative Negative   Ketones Urine      NEG:Negative mg/dL 15 (A)   Specific Gravity Urine      1.003 - 1.035 >1.030   Blood Urine      NEG:Negative Trace (A)   pH Urine      5.0 - 7.0 pH 6.0   Protein Albumin Urine      NEG:Negative mg/dL Trace (A)   Urobilinogen Urine      0.2 - 1.0 EU/dL 0.2   Nitrite Urine      NEG:Negative Negative   Leukocyte Esterase Urine      NEG:Negative Negative   Source       Midstream Urine   WBC Urine      OTO5:0 - 5 /HPF 0 - 5   RBC Urine      OTO2:O - 2 /HPF 2-5 (A)   Squamous Epithelial /LPF Urine      FEW:Few /LPF Few   Bacteria Urine      NEG:Negative /HPF Moderate (A)   Mucous Urine      NEG:Negative /LPF Present (A)

## 2019-01-16 ENCOUNTER — HOSPITAL ENCOUNTER (EMERGENCY)
Facility: CLINIC | Age: 29
Discharge: HOME | End: 2019-01-16
Attending: EMERGENCY MEDICINE | Admitting: EMERGENCY MEDICINE
Payer: COMMERCIAL

## 2019-01-16 ENCOUNTER — APPOINTMENT (OUTPATIENT)
Dept: CT IMAGING | Facility: CLINIC | Age: 29
End: 2019-01-16
Payer: COMMERCIAL

## 2019-01-16 VITALS
HEIGHT: 67 IN | OXYGEN SATURATION: 98 % | DIASTOLIC BLOOD PRESSURE: 79 MMHG | HEART RATE: 62 BPM | SYSTOLIC BLOOD PRESSURE: 114 MMHG | RESPIRATION RATE: 22 BRPM | BODY MASS INDEX: 25.9 KG/M2 | WEIGHT: 165 LBS | TEMPERATURE: 97.1 F

## 2019-01-16 DIAGNOSIS — N20.1 URETERAL STONE: ICD-10-CM

## 2019-01-16 LAB
ALBUMIN UR-MCNC: NEGATIVE MG/DL
ANION GAP SERPL CALCULATED.3IONS-SCNC: 8 MMOL/L (ref 3–14)
APPEARANCE UR: ABNORMAL
B-HCG FREE SERPL-ACNC: <5 IU/L
BACTERIA #/AREA URNS HPF: ABNORMAL /HPF
BILIRUB UR QL STRIP: NEGATIVE
BUN SERPL-MCNC: 12 MG/DL (ref 7–30)
CALCIUM SERPL-MCNC: 8.9 MG/DL (ref 8.5–10.1)
CHLORIDE SERPL-SCNC: 102 MMOL/L (ref 94–109)
CO2 SERPL-SCNC: 27 MMOL/L (ref 20–32)
COLOR UR AUTO: YELLOW
CREAT SERPL-MCNC: 0.78 MG/DL (ref 0.52–1.04)
ERYTHROCYTE [DISTWIDTH] IN BLOOD BY AUTOMATED COUNT: 11.9 % (ref 10–15)
GFR SERPL CREATININE-BSD FRML MDRD: >90 ML/MIN/{1.73_M2}
GLUCOSE SERPL-MCNC: 114 MG/DL (ref 70–99)
GLUCOSE UR STRIP-MCNC: NEGATIVE MG/DL
HCT VFR BLD AUTO: 42.7 % (ref 35–47)
HGB BLD-MCNC: 14.3 G/DL (ref 11.7–15.7)
HGB UR QL STRIP: ABNORMAL
KETONES UR STRIP-MCNC: NEGATIVE MG/DL
LEUKOCYTE ESTERASE UR QL STRIP: NEGATIVE
MCH RBC QN AUTO: 30.4 PG (ref 26.5–33)
MCHC RBC AUTO-ENTMCNC: 33.5 G/DL (ref 31.5–36.5)
MCV RBC AUTO: 91 FL (ref 78–100)
MUCOUS THREADS #/AREA URNS LPF: PRESENT /LPF
NITRATE UR QL: NEGATIVE
PH UR STRIP: 5 PH (ref 5–7)
PLATELET # BLD AUTO: 251 10E9/L (ref 150–450)
POTASSIUM SERPL-SCNC: 3.4 MMOL/L (ref 3.4–5.3)
RBC # BLD AUTO: 4.71 10E12/L (ref 3.8–5.2)
RBC #/AREA URNS AUTO: 12 /HPF (ref 0–2)
SODIUM SERPL-SCNC: 137 MMOL/L (ref 133–144)
SOURCE: ABNORMAL
SP GR UR STRIP: 1.02 (ref 1–1.03)
SQUAMOUS #/AREA URNS AUTO: 7 /HPF (ref 0–1)
UROBILINOGEN UR STRIP-MCNC: 0 MG/DL (ref 0–2)
WBC # BLD AUTO: 11.3 10E9/L (ref 4–11)
WBC #/AREA URNS AUTO: 11 /HPF (ref 0–5)

## 2019-01-16 PROCEDURE — 99285 EMERGENCY DEPT VISIT HI MDM: CPT | Mod: 25

## 2019-01-16 PROCEDURE — 80048 BASIC METABOLIC PNL TOTAL CA: CPT | Performed by: EMERGENCY MEDICINE

## 2019-01-16 PROCEDURE — 74176 CT ABD & PELVIS W/O CONTRAST: CPT

## 2019-01-16 PROCEDURE — 25000128 H RX IP 250 OP 636: Performed by: EMERGENCY MEDICINE

## 2019-01-16 PROCEDURE — 84702 CHORIONIC GONADOTROPIN TEST: CPT

## 2019-01-16 PROCEDURE — 85027 COMPLETE CBC AUTOMATED: CPT | Performed by: EMERGENCY MEDICINE

## 2019-01-16 PROCEDURE — 96361 HYDRATE IV INFUSION ADD-ON: CPT

## 2019-01-16 PROCEDURE — 25000132 ZZH RX MED GY IP 250 OP 250 PS 637: Performed by: EMERGENCY MEDICINE

## 2019-01-16 PROCEDURE — 81001 URINALYSIS AUTO W/SCOPE: CPT | Performed by: EMERGENCY MEDICINE

## 2019-01-16 PROCEDURE — 96374 THER/PROPH/DIAG INJ IV PUSH: CPT

## 2019-01-16 PROCEDURE — 87086 URINE CULTURE/COLONY COUNT: CPT | Performed by: EMERGENCY MEDICINE

## 2019-01-16 RX ORDER — ONDANSETRON 2 MG/ML
4 INJECTION INTRAMUSCULAR; INTRAVENOUS ONCE
Status: DISCONTINUED | OUTPATIENT
Start: 2019-01-16 | End: 2019-01-16 | Stop reason: HOSPADM

## 2019-01-16 RX ORDER — KETOROLAC TROMETHAMINE 15 MG/ML
15 INJECTION, SOLUTION INTRAMUSCULAR; INTRAVENOUS ONCE
Status: COMPLETED | OUTPATIENT
Start: 2019-01-16 | End: 2019-01-16

## 2019-01-16 RX ORDER — TAMSULOSIN HYDROCHLORIDE 0.4 MG/1
0.4 CAPSULE ORAL ONCE
Status: COMPLETED | OUTPATIENT
Start: 2019-01-16 | End: 2019-01-16

## 2019-01-16 RX ORDER — TAMSULOSIN HYDROCHLORIDE 0.4 MG/1
0.4 CAPSULE ORAL DAILY
Qty: 10 CAPSULE | Refills: 0 | Status: SHIPPED | OUTPATIENT
Start: 2019-01-16 | End: 2019-01-26

## 2019-01-16 RX ORDER — ONDANSETRON 4 MG/1
4 TABLET, ORALLY DISINTEGRATING ORAL EVERY 6 HOURS PRN
Qty: 15 TABLET | Refills: 0 | Status: SHIPPED | OUTPATIENT
Start: 2019-01-16 | End: 2019-01-19

## 2019-01-16 RX ORDER — OXYCODONE HYDROCHLORIDE 5 MG/1
5-10 TABLET ORAL EVERY 6 HOURS PRN
Qty: 15 TABLET | Refills: 0 | Status: SHIPPED | OUTPATIENT
Start: 2019-01-16 | End: 2019-01-19

## 2019-01-16 RX ADMIN — SODIUM CHLORIDE 1000 ML: 9 INJECTION, SOLUTION INTRAVENOUS at 17:26

## 2019-01-16 RX ADMIN — TAMSULOSIN HYDROCHLORIDE 0.4 MG: 0.4 CAPSULE ORAL at 19:27

## 2019-01-16 RX ADMIN — KETOROLAC TROMETHAMINE 15 MG: 15 INJECTION, SOLUTION INTRAMUSCULAR; INTRAVENOUS at 17:30

## 2019-01-16 ASSESSMENT — ENCOUNTER SYMPTOMS
FLANK PAIN: 1
DIARRHEA: 0
FREQUENCY: 0
NAUSEA: 1
DYSURIA: 0
HEMATURIA: 0
VOMITING: 0
CONSTIPATION: 0

## 2019-01-16 ASSESSMENT — MIFFLIN-ST. JEOR: SCORE: 1506.07

## 2019-01-16 NOTE — ED TRIAGE NOTES
Right sided flank pain started this afternoon.  History of stones that felt that same.   ABCs intact.  Alert and oriented x 3.

## 2019-01-16 NOTE — ED PROVIDER NOTES
History     Chief Complaint:  Flank Pain      HPI   Kina Melendez is a 28 year old female who presents to the emergency department for evaluation of flank pain. Of note, the patient reports that on 12/30/18 she had the sudden onset of a mild right sided mid-low back ache. She initially thought this was UTI as she was not very hydrated and had just gotten back from a long road trip with few bathroom stops. She called [facility] for possible antibiotics for a UTI but shortly after, the pain became severe so she was seen at [facility]. They did not do a CT scan at the time and told her to stay hydrated but to come back if the pain returned with the tentative diagnosis of ureteral stone. She thought she had passed the stone because pain had resolved and she was staying very hydrated. 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 She is also feeling  nauseous. She states that pain slightly increases with movement but there are no alleviating factors. The pain does not radiate to her abdomen. She last took 600 mg ibuprofen one hour ago. Her last menstrual cycle started 12/31/18. The patient denies vomiting, dysuria, hematuria, frequency, vaginal bleeding, discharge, diarrhea, or constipation.     Allergies:  No Known Drug Allergies     Medications:    Vitamin D3     Past Medical History:    Synovitis of knee  Vitamin D deficiency     Past Surgical History:    Appendectomy  Left knee LRR meniscectomy     Family History:    Lung cancer  Nephrolithiasis  Diabetes  Kidney cancer  Cerebrovascular disease  Breast cancer   Pancreatic cancer  Depression    Social History:  Negative for tobacco use.  Occasional alcohol use.   Marital Status:  Single      Review of Systems   Gastrointestinal: Positive for nausea. Negative for constipation, diarrhea and vomiting.   Genitourinary: Positive for flank pain. Negative for  "dysuria, frequency, hematuria, vaginal bleeding and vaginal discharge.   All other systems reviewed and are negative.        Physical Exam     Patient Vitals for the past 24 hrs:   BP Temp Temp src Pulse Resp SpO2 Height Weight   01/16/19 1707 140/87 97.1  F (36.2  C) Temporal 81 22 100 % 1.702 m (5' 7\") 74.8 kg (165 lb)       Physical Exam    Constitutional:  Pleasant, age appropriate female who appears in discomfort  HEENT:    Oropharynx is moist  Eyes:    Conjunctiva normal  Neck:    Supple, no meningismus.     CV:     Regular rate and rhythm.      No murmurs, rubs or gallops.     No lower extremity edema.  PULM:    Clear to auscultation bilateral.       No respiratory distress.      Good air exchange.     No rales or wheezing.  ABD:    Soft, non-distended.       Minimal diffuse abdominal tenderness.     Bowel sounds normal.     No pulsatile masses.       No rebound, guarding or rigidity.     Mild right CVA tenderness.      No hepatosplenomegaly.  MSK:     No gross deformity to all four extremities.   LYMPH:   No cervical lymphadenopathy.  NEURO:   Alert.  Good muscular tone, no atrophy.   Skin:    Warm, dry and intact.    Psych:    Mood is good and affect is appropriate.      Emergency Department Course   Imaging:  Abdomen/Pelvis CT without IV contrast:  IMPRESSION:   1. 0.4 cm distal right ureteral calculus at the ureterovesicular  junction, resulting in moderate to marked obstruction.  2. Single tiny nonobstructing calculi in each kidney.  Report per radiology.     Radiographic findings were communicated with the patient who voiced understanding of the findings.    Laboratory:  CBC: WBC: 11.3 (H), HGB: 14.3, PLT: 251  BMP: Glucose 114 (H), o/w WNL (Creatinine: 0.78)    UA: Slightly cloudy yellow urine, blood: moderate, WBC: 11, RBC: 12, bacteria: few, squamous epithelial/HPF: 7, mucous: present, otherwise WNL  Urine Culture: In process    ISTAT HCG quantitative pregnancy: <5.0     Interventions:  1726 0.9% " Sodium Chloride BOLUS 1000 mLs IV   1730 Toradol 15 mg IV    Emergency Department Course:  1710 Nursing notes and vitals reviewed. I performed an exam of the patient as documented above.     IV inserted. Medicine administered as documented above. Blood drawn. This was sent to the lab for further testing, results above.    The patient provided a urine sample here in the emergency department. This was sent for laboratory testing, findings above.     The patient was sent for an abdomen/pelvis CT while in the emergency department, findings above.     1857 I rechecked the patient and discussed the results of her workup thus far.     Findings and plan explained to the Patient. Patient discharged home with instructions regarding supportive care, medications, and reasons to return. The importance of close follow-up was reviewed. The patient was prescribed Zofran, Roxicodone, and Flomax.     I personally reviewed the laboratory results with the Patient and answered all related questions prior to discharge.     Impression & Plan    Medical Decision Making:  Kina Melendez is a 28 year old female who presented with unilateral flank pain consistent with renal colic. CT confirms a 4 mm ureteral stone at the UVJ.  Renal function is normal/baseline.  CT and lab workup show no other alternative etiology that could be causing her symptoms (e.g., AAA, appendicitis, pyelonephritis). There is no fever or convincing evidence of a urinary tract infection. On recheck, her pain is controlled with interventions in the ED and she is tolerating POs. I will prescribe supportive medications and Flomax to facilitate stone passage. I have advised her to return for uncontrolled pain, vomiting, fever, or any other concerning symptoms. I also advised to strain her urine to look for a stone and submit it to her primary doctor for lab analysis.  Finally, I have advised follow up with urology within 3-5 days.      Diagnosis:    ICD-10-CM    1. Ureteral  stone N20.1        Disposition:  Discharged to home    Discharge Medications:     Medication List      Started    ondansetron 4 MG ODT tab  Commonly known as:  ZOFRAN ODT  4 mg, Oral, EVERY 6 HOURS PRN     oxyCODONE 5 MG tablet  Commonly known as:  ROXICODONE  5-10 mg, Oral, EVERY 6 HOURS PRN     tamsulosin 0.4 MG capsule  Commonly known as:  FLOMAX  0.4 mg, Oral, DAILY          Scribe Disclosure:  I, Corey Jiang, am serving as a scribe on 1/16/2019 at 5:10 PM to personally document services performed by Dr. Bear MD based on my observations and the provider's statements to me.     Corey Jiang  1/16/2019   [facility] EMERGENCY DEPARTMENT       Adrián Sifuentes MD  01/16/19 5550

## 2019-01-16 NOTE — ED AVS SNAPSHOT
[facility] Emergency Department  [address]  [facility] [address]  Phone:  [phone]  Fax:  [phone]                                    Kina Melenedz   MRN: [id number]    Department:  [facility] Emergency Department   Date of Visit:  1/16/2019           After Visit Summary Signature Page    I have received my discharge instructions, and my questions have been answered. I have discussed any challenges I see with this plan with the nurse or doctor.    ..........................................................................................................................................  Patient/Patient Representative Signature      ..........................................................................................................................................  Patient Representative Print Name and Relationship to Patient    ..................................................               ................................................  Date                                   Time    ..........................................................................................................................................  Reviewed by Signature/Title    ...................................................              ..............................................  Date                                               Time          22EPIC Rev 08/18

## 2019-01-17 LAB
BACTERIA SPEC CULT: NORMAL
Lab: NORMAL
SPECIMEN SOURCE: NORMAL

## 2019-01-17 NOTE — DISCHARGE INSTRUCTIONS
Please make an appointment to follow up with [facility] [phone] in 3-5 days even if entirely better.    Discharge Instructions  Kidney Stones    Kidney stones are a common problem that can cause a lot of pain but fortunately are usually not dangerous. Kidney stones form in the kidney and then can cause a blockage (obstruction) of the flow of urine from the kidney which leads to pain. Most patients can manage kidney stones at home (without a hospital stay).  However, sometimes your condition may be worse than it seemed at first, or may get worse with time. Most kidney stones will pass on their own, but occasionally stones may need to be removed by an urologist.    Generally, every Emergency Department visit should have a follow-up clinic visit with either a primary or a specialty clinic/provider. Please follow-up as instructed by your emergency provider today.      Return to the Emergency Department if:  Your pain is not controlled despite the medications provided or recommended.  You are vomiting (throwing up) and cannot keep fluids or medications down.  You develop a fever (>100.4 F).  You feel much more ill or develop new symptoms.  What can I do to help myself?  Be sure to drink plenty of fluids.  If instructed to do so, strain your urine (pee) with the urine strainer you were provided with today. Your stone may look like a grain of sand or a small pebble. Collect any stones in the cup provided and bring to your follow-up appointment.  Staying active is good, and may help the stone to pass. You may do whatever you feel up to doing without restrictions.   Treatment:  Non-steroidal anti-inflammatory drugs (NSAIDs). This includes prescription medicines like Toradol  (ketorolac) and non-prescription medicines like Advil  (ibuprofen) and Nuprin  (ibuprofen) and Naproxen. These pain relievers are very effective for kidney stones.  Nausea (sick to your stomach) medication.  Nausea and vomiting are common  with kidney stones, so your provider may send you home with medicine for this.   Flomax  (tamsulosin). This medicine is sometimes used for men with prostate problems, but also can help kidney stones to pass. Its effectiveness is controversial or questionable so it is prescribed in certain situations. This medicine can lower blood pressure, and you may feel faint/lightheaded, especially when you first stand up. Be sure to get up gradually, sit down if you feel faint, and avoid activity where feeling faint would be dangerous, such as climbing ladders.  If you were given a prescription for medicine here today, be sure to read all of the information (including the package insert) that comes with your prescription.  This will include important information about the medicine, its side effects, and any warnings that you need to know about.  The pharmacist who fills the prescription can provide more information and answer questions you may have about the medicine.  If you have questions or concerns that the pharmacist cannot address, please call or return to the Emergency Department.   Remember that you can always come back to the Emergency Department if you are not able to see your regular provider in the amount of time listed above, if you get any new symptoms, or if there is anything that worries you.

## 2019-01-18 NOTE — RESULT ENCOUNTER NOTE
Final urine culture report is NEGATIVE per [facility] ED Lab Result protocol.    If NEGATIVE result, no change in treatment, per [facility] ED Lab Result protocol.

## 2019-02-15 ENCOUNTER — HEALTH MAINTENANCE LETTER (OUTPATIENT)
Age: 29
End: 2019-02-15

## 2019-11-07 ENCOUNTER — HEALTH MAINTENANCE LETTER (OUTPATIENT)
Age: 29
End: 2019-11-07

## 2020-11-29 ENCOUNTER — HEALTH MAINTENANCE LETTER (OUTPATIENT)
Age: 30
End: 2020-11-29

## 2021-09-25 ENCOUNTER — HEALTH MAINTENANCE LETTER (OUTPATIENT)
Age: 31
End: 2021-09-25

## 2022-01-15 ENCOUNTER — HEALTH MAINTENANCE LETTER (OUTPATIENT)
Age: 32
End: 2022-01-15

## 2022-12-26 ENCOUNTER — HEALTH MAINTENANCE LETTER (OUTPATIENT)
Age: 32
End: 2022-12-26

## 2023-04-16 ENCOUNTER — HEALTH MAINTENANCE LETTER (OUTPATIENT)
Age: 33
End: 2023-04-16